# Patient Record
Sex: FEMALE | Race: WHITE | NOT HISPANIC OR LATINO | URBAN - METROPOLITAN AREA
[De-identification: names, ages, dates, MRNs, and addresses within clinical notes are randomized per-mention and may not be internally consistent; named-entity substitution may affect disease eponyms.]

---

## 2019-03-16 ENCOUNTER — EMERGENCY (EMERGENCY)
Facility: HOSPITAL | Age: 29
LOS: 1 days | Discharge: HOME | End: 2019-03-16
Admitting: EMERGENCY MEDICINE
Payer: COMMERCIAL

## 2019-03-16 VITALS
DIASTOLIC BLOOD PRESSURE: 78 MMHG | TEMPERATURE: 99 F | RESPIRATION RATE: 14 BRPM | HEART RATE: 66 BPM | HEIGHT: 69 IN | SYSTOLIC BLOOD PRESSURE: 117 MMHG | WEIGHT: 119.93 LBS | OXYGEN SATURATION: 96 %

## 2019-03-16 DIAGNOSIS — R07.89 OTHER CHEST PAIN: ICD-10-CM

## 2019-03-16 DIAGNOSIS — Z79.899 OTHER LONG TERM (CURRENT) DRUG THERAPY: ICD-10-CM

## 2019-03-16 PROCEDURE — 71046 X-RAY EXAM CHEST 2 VIEWS: CPT | Mod: 26

## 2019-03-16 PROCEDURE — 99285 EMERGENCY DEPT VISIT HI MDM: CPT | Mod: 25

## 2019-03-16 PROCEDURE — 93010 ELECTROCARDIOGRAM REPORT: CPT

## 2019-03-16 RX ORDER — IBUPROFEN 200 MG
400 TABLET ORAL ONCE
Qty: 0 | Refills: 0 | Status: COMPLETED | OUTPATIENT
Start: 2019-03-16 | End: 2019-03-16

## 2019-03-16 RX ADMIN — Medication 400 MILLIGRAM(S): at 23:57

## 2019-03-16 NOTE — ED ADULT TRIAGE NOTE - CHIEF COMPLAINT QUOTE
Patient presents to the ED from an   complaining of chest pain x 3 days, steady, radiates to L arm. No dizziness, no shortness of breath

## 2019-03-17 LAB
ALBUMIN SERPL ELPH-MCNC: 3.7 G/DL — SIGNIFICANT CHANGE UP (ref 3.4–5)
ALP SERPL-CCNC: 44 U/L — SIGNIFICANT CHANGE UP (ref 40–120)
ALT FLD-CCNC: 19 U/L — SIGNIFICANT CHANGE UP (ref 12–42)
ANION GAP SERPL CALC-SCNC: 10 MMOL/L — SIGNIFICANT CHANGE UP (ref 9–16)
AST SERPL-CCNC: 20 U/L — SIGNIFICANT CHANGE UP (ref 15–37)
BASOPHILS NFR BLD AUTO: 0.8 % — SIGNIFICANT CHANGE UP (ref 0–2)
BILIRUB SERPL-MCNC: 0.5 MG/DL — SIGNIFICANT CHANGE UP (ref 0.2–1.2)
BUN SERPL-MCNC: 15 MG/DL — SIGNIFICANT CHANGE UP (ref 7–23)
CALCIUM SERPL-MCNC: 9.3 MG/DL — SIGNIFICANT CHANGE UP (ref 8.5–10.5)
CHLORIDE SERPL-SCNC: 106 MMOL/L — SIGNIFICANT CHANGE UP (ref 96–108)
CO2 SERPL-SCNC: 25 MMOL/L — SIGNIFICANT CHANGE UP (ref 22–31)
CREAT SERPL-MCNC: 0.95 MG/DL — SIGNIFICANT CHANGE UP (ref 0.5–1.3)
D DIMER BLD IA.RAPID-MCNC: <187 NG/ML DDU — SIGNIFICANT CHANGE UP
EOSINOPHIL NFR BLD AUTO: 1.6 % — SIGNIFICANT CHANGE UP (ref 0–6)
GLUCOSE SERPL-MCNC: 89 MG/DL — SIGNIFICANT CHANGE UP (ref 70–99)
HCG SERPL-ACNC: <1 MIU/ML — SIGNIFICANT CHANGE UP
HCT VFR BLD CALC: 39.8 % — SIGNIFICANT CHANGE UP (ref 34.5–45)
HGB BLD-MCNC: 13.6 G/DL — SIGNIFICANT CHANGE UP (ref 11.5–15.5)
IMM GRANULOCYTES NFR BLD AUTO: 0.1 % — SIGNIFICANT CHANGE UP (ref 0–1.5)
LYMPHOCYTES # BLD AUTO: 19.6 % — SIGNIFICANT CHANGE UP (ref 13–44)
MCHC RBC-ENTMCNC: 30.6 PG — SIGNIFICANT CHANGE UP (ref 27–34)
MCHC RBC-ENTMCNC: 34.2 G/DL — SIGNIFICANT CHANGE UP (ref 32–36)
MCV RBC AUTO: 89.4 FL — SIGNIFICANT CHANGE UP (ref 80–100)
MONOCYTES NFR BLD AUTO: 10.5 % — SIGNIFICANT CHANGE UP (ref 2–14)
NEUTROPHILS NFR BLD AUTO: 67.4 % — SIGNIFICANT CHANGE UP (ref 43–77)
PLATELET # BLD AUTO: 188 K/UL — SIGNIFICANT CHANGE UP (ref 150–400)
POTASSIUM SERPL-MCNC: 4.8 MMOL/L — SIGNIFICANT CHANGE UP (ref 3.5–5.3)
POTASSIUM SERPL-SCNC: 4.8 MMOL/L — SIGNIFICANT CHANGE UP (ref 3.5–5.3)
PROT SERPL-MCNC: 7.3 G/DL — SIGNIFICANT CHANGE UP (ref 6.4–8.2)
RBC # BLD: 4.45 M/UL — SIGNIFICANT CHANGE UP (ref 3.8–5.2)
RBC # FLD: 12.3 % — SIGNIFICANT CHANGE UP (ref 10.3–14.5)
SODIUM SERPL-SCNC: 141 MMOL/L — SIGNIFICANT CHANGE UP (ref 132–145)
TROPONIN I SERPL-MCNC: <0.017 NG/ML — LOW (ref 0.02–0.06)
WBC # BLD: 7.7 K/UL — SIGNIFICANT CHANGE UP (ref 3.8–10.5)
WBC # FLD AUTO: 7.7 K/UL — SIGNIFICANT CHANGE UP (ref 3.8–10.5)

## 2019-03-17 PROCEDURE — 71046 X-RAY EXAM CHEST 2 VIEWS: CPT | Mod: 26

## 2019-03-17 NOTE — ED PROVIDER NOTE - CLINICAL SUMMARY MEDICAL DECISION MAKING FREE TEXT BOX
28 y/o F Hx Anxiety p/w 3 days of waxing/waning, sharp CP. Physical exam unremarkable. AFVSS. Will order, EKG, CXR, labs including troponin and D-dimer, administer ibuprofen, and reassess.

## 2019-03-17 NOTE — ED PROVIDER NOTE - OBJECTIVE STATEMENT
28 y/o F with PMH of Anxiety, on Xanax, presents to the ED with 3 days of waxing and waning, sharp, chest pain. The pain is migratory in nature and is felt in different areas of the chest. She describes the pain as mild and states it hovers between a 3 to 4 out of 10 with no aggravating or alleviating factors. She states that sometimes she feels pain traveling down her LUE as well. She has not taken any medication for the pain.    Denies fever, chills, dizziness, headache, syncope, SOB, palpitations, cough, hemoptysis, back pain, abdo pain, N/V, calf pain or swelling, cigarette smoking, OCP use, recent travel, recent surgery or injury

## 2019-03-17 NOTE — ED PROVIDER NOTE - NSFOLLOWUPINSTRUCTIONS_ED_ALL_ED_FT
FOLLOW UP WITH DR. TURNER (CARDIOLOGIST) IN 1-3 DAYS FOR FURTHER EVALUATION AND MANAGEMENT AS DISCUSSED.    RETURN TO THE ER IMMEDIATELY IF CHEST PAIN RECURS/WORSENS, OR IF YOU DEVELOP FEVER, CHILLS, SHORTNESS OF BREATH, SENSATION OF AN IRREGULAR HEART BEAT, DIZZINESS, NAUSEA, VOMITING OR ANY OTHER CONCERNING SYMPTOMS.

## 2019-03-17 NOTE — ED PROVIDER NOTE - CARE PROVIDER_API CALL
Kayode Galloway)  Cardiovascular Disease; Internal Medicine  [address], [address]rd [place] [address]  Phone: [phone]  Fax: [phone]  Follow Up Time: 1-3 Days

## 2019-03-17 NOTE — ED PROVIDER NOTE - PROGRESS NOTE DETAILS
EKG NSR, nonischemic. CXR shows no acute cardiopulmonary pathology. Labs reviewed with no acute, concerning findings. Troponin and D-dimer negative. Pt was given ibuprofen in the ED

## 2021-01-01 NOTE — ED PROVIDER NOTE - NS ED MD DISPO DISCHARGE CCDA
SUBJECTIVE:     Lina Low is a 2 month old female, here for a routine health maintenance visit.    Patient was roomed by: ROSEMARIE BRANNON    [facility]    Social History  Patient accompanied by:  Mother and brother  Questions or concerns?: YES (head shape and feeding )    Forms to complete? No  Child lives with::  Mother, father and brother  Who takes care of your child?:  Home with family member  Languages spoken in the home:  English  Recent family changes/ special stressors?:  None noted    Safety / Health Risk  Is your child around anyone who smokes?  No    TB Exposure:     No TB exposure    Car seat < 6 years old, in  back seat, rear-facing, 5-point restraint? Yes    Home Safety Survey:      Firearms in the home?: YES          Are trigger locks present?  Yes        Is ammunition stored separately? Yes    Hearing / Vision  Hearing or vision concerns?  No concerns, hearing and vision subjectively normal    Daily Activities    Water source:  City water  Nutrition:  Breastmilk, pumped breastmilk by bottle and formula  Breastfeeding concerns?  Breastfeeding NOTgoing well      Breastfeeding concerns include:  Other concerns  Formula:  OTHER*  Vitamins & Supplements:  No    Elimination       Urinary frequency:more than 6 times per 24 hours     Stool frequency: once per 48 hours     Stool consistency: soft     Elimination problems:  None    Sleep      Sleep arrangement:co-sleeper    Sleep position:  On back    Sleep pattern: wakes at night for feedings and SLEEPS THROUGH NIGHT      [place]  Depression Scale (EPDS) Risk Assessment: Completed [place]      BIRTH HISTORY  [place] metabolic screening: All components normal    DEVELOPMENT  No screening tool used  Milestones (by observation/ exam/ report) 75-90% ile  PERSONAL/ SOCIAL/COGNITIVE:    Regards face    Smiles responsively  LANGUAGE:    Vocalizes    Responds to sound  GROSS MOTOR:    Lift head when prone    Kicks / equal movements  FINE MOTOR/  "ADAPTIVE:    Eyes follow past midline    Reflexive grasp    PROBLEM LIST  There is no problem list on file for this patient.    MEDICATIONS  No current outpatient medications on file.      ALLERGY  No Known Allergies    IMMUNIZATIONS    There is no immunization history on file for this patient.    HEALTH HISTORY SINCE LAST VISIT  No surgery, major illness or injury since last physical exam    ROS  Constitutional, eye, ENT, skin, respiratory, cardiac, and GI are normal except as otherwise noted.    OBJECTIVE:   EXAM  Temp 99  F (37.2  C) (Rectal)   Ht 1' 10.84\" (0.58 m)   Wt 10 lb 3.5 oz (4.635 kg)   HC 15.63\" (39.7 cm)   BMI 13.78 kg/m    85 %ile (Z= 1.05) based on WHO (Girls, 0-2 years) head circumference-for-age based on Head Circumference recorded on 2021.  18 %ile (Z= -0.92) based on WHO (Girls, 0-2 years) weight-for-age data using vitals from 2021.  61 %ile (Z= 0.28) based on WHO (Girls, 0-2 years) Length-for-age data based on Length recorded on 2021.  5 %ile (Z= -1.62) based on WHO (Girls, 0-2 years) weight-for-recumbent length data based on body measurements available as of 2021.  GENERAL: Active, alert,  no  distress.  SKIN: Clear. No significant rash, abnormal pigmentation or lesions.  HEAD: Normocephalic. Normal fontanels and sutures.  EYES: Conjunctivae and cornea normal. Red reflexes present bilaterally.  EARS: normal: no effusions, no erythema, normal landmarks  NOSE: Normal without discharge.  MOUTH/THROAT: Clear. No oral lesions.  NECK: Supple, no masses.  LYMPH NODES: No adenopathy  LUNGS: Clear. No rales, rhonchi, wheezing or retractions  HEART: Regular rate and rhythm. Normal S1/S2. No murmurs. Normal femoral pulses.  ABDOMEN: Soft, non-tender, not distended, no masses or hepatosplenomegaly. Normal umbilicus and bowel sounds.   GENITALIA: Normal female external genitalia. Jan stage I,  No inguinal herniae are present.  EXTREMITIES: Hips normal with negative Ortolani and " Minor. Symmetric creases and  no deformities  NEUROLOGIC: Normal tone throughout. Normal reflexes for age    ASSESSMENT/PLAN:   1. Encounter for routine child health examination w/o abnormal findings  Well child with normal growth and development  - MATERNAL HEALTH RISK ASSESSMENT (58661)- EPDS  - DTAP - HIB - IPV VACCINE, IM USE (Pentacel) [[id number]]  - HEPATITIS B VACCINE,PED/ADOL,IM [[id number]]  - PNEUMOCOCCAL CONJ VACCINE 13 VALENT IM [[id number]]  - ROTAVIRUS, 2 DOSE, PO (6WKS - 8 MO AND 0 DAYS) - [employer] ([id number])    Anticipatory Guidance  Reviewed Anticipatory Guidance in patient instructions    Preventive Care Plan  Immunizations     See orders in EpicCare.  I reviewed the signs and symptoms of adverse effects and when to seek medical care if they should arise.  Referrals/Ongoing Specialty care: No   See other orders in [facility]    Resources:  [place] Child and Teen Checkups (C&TC) Schedule of Age-Related Screening Standards    FOLLOW-UP:    4 month Preventive Care visit    Lupe Tavares MD  [facility]   Patient/Caregiver provided printed discharge information.